# Patient Record
Sex: FEMALE | Race: WHITE | Employment: FULL TIME | URBAN - METROPOLITAN AREA
[De-identification: names, ages, dates, MRNs, and addresses within clinical notes are randomized per-mention and may not be internally consistent; named-entity substitution may affect disease eponyms.]

---

## 2017-01-21 ENCOUNTER — HOSPITAL ENCOUNTER (OUTPATIENT)
Dept: CT IMAGING | Facility: HOSPITAL | Age: 62
Discharge: HOME | End: 2017-01-21
Attending: UROLOGY
Payer: MEDICAID

## 2017-01-21 DIAGNOSIS — L91.8 FIBROEPITHELIAL POLYP: ICD-10-CM

## 2017-01-21 DIAGNOSIS — N13.30 HYDRONEPHROSIS, LEFT: ICD-10-CM

## 2017-01-21 LAB — CREAT BLD-MCNC: 0.8 MG/DL (ref 0.5–1.5)

## 2017-01-21 PROCEDURE — 82565 ASSAY OF CREATININE: CPT

## 2017-01-21 PROCEDURE — 74178 CT ABD&PLV WO CNTR FLWD CNTR: CPT

## 2017-02-08 ENCOUNTER — HOSPITAL (OUTPATIENT)
Dept: OTHER | Age: 62
End: 2017-02-08

## 2017-02-08 LAB
GLUCOSE BLDC GLUCOMTR-MCNC: 176 MG/DL (ref 65–99)
GLUCOSE BLDC GLUCOMTR-MCNC: 176 MG/DL (ref 65–99)

## 2017-03-14 PROBLEM — L91.8 FIBROEPITHELIAL POLYP: Status: ACTIVE | Noted: 2017-03-14

## 2017-03-21 ENCOUNTER — HOSPITAL (OUTPATIENT)
Dept: OTHER | Age: 62
End: 2017-03-21
Attending: UROLOGY

## 2017-03-21 LAB
ANION GAP SERPL CALC-SCNC: 15 MMOL/L (ref 10–20)
BUN SERPL-MCNC: 24 MG/DL (ref 6–20)
BUN/CREAT SERPL: 24 (ref 7–25)
CALCIUM SERPL-MCNC: 9 MG/DL (ref 8.4–10.2)
CHLORIDE: 107 MMOL/L (ref 98–107)
CO2 SERPL-SCNC: 23 MMOL/L (ref 21–32)
CREAT SERPL-MCNC: 0.99 MG/DL (ref 0.51–0.95)
GLUCOSE SERPL-MCNC: 181 MG/DL (ref 65–99)
LENGTH OF FAST TIME PATIENT: ABNORMAL HR
POTASSIUM SERPL-SCNC: 3.9 MMOL/L (ref 3.4–5.1)
SODIUM SERPL-SCNC: 141 MMOL/L (ref 135–145)

## 2017-04-01 ENCOUNTER — HOSPITAL (OUTPATIENT)
Dept: OTHER | Age: 62
End: 2017-04-01
Attending: UROLOGY

## 2017-06-15 ENCOUNTER — ANESTHESIA EVENT (OUTPATIENT)
Dept: SURGERY | Facility: HOSPITAL | Age: 62
DRG: 655 | End: 2017-06-15
Payer: MEDICAID

## 2017-06-20 ENCOUNTER — HOSPITAL ENCOUNTER (INPATIENT)
Facility: HOSPITAL | Age: 62
LOS: 1 days | Discharge: HOME | DRG: 655 | End: 2017-06-21
Attending: UROLOGY | Admitting: UROLOGY
Payer: MEDICAID

## 2017-06-20 ENCOUNTER — SURGERY (OUTPATIENT)
Age: 62
End: 2017-06-20

## 2017-06-20 ENCOUNTER — ANESTHESIA (OUTPATIENT)
Dept: SURGERY | Facility: HOSPITAL | Age: 62
DRG: 655 | End: 2017-06-20
Payer: MEDICAID

## 2017-06-20 PROBLEM — N13.5 URETERAL OBSTRUCTION, RIGHT: Status: ACTIVE | Noted: 2017-06-20

## 2017-06-20 PROCEDURE — 0T774DZ DILATION OF LEFT URETER WITH INTRALUMINAL DEVICE, PERCUTANEOUS ENDOSCOPIC APPROACH: ICD-10-PCS | Performed by: UROLOGY

## 2017-06-20 PROCEDURE — 88305 TISSUE EXAM BY PATHOLOGIST: CPT | Performed by: UROLOGY

## 2017-06-20 PROCEDURE — 94660 CPAP INITIATION&MGMT: CPT

## 2017-06-20 PROCEDURE — 82962 GLUCOSE BLOOD TEST: CPT

## 2017-06-20 PROCEDURE — 0TN73ZZ RELEASE LEFT URETER, PERCUTANEOUS APPROACH: ICD-10-PCS | Performed by: UROLOGY

## 2017-06-20 PROCEDURE — 0T174ZB BYPASS LEFT URETER TO BLADDER, PERCUTANEOUS ENDOSCOPIC APPROACH: ICD-10-PCS | Performed by: UROLOGY

## 2017-06-20 PROCEDURE — 0TQB4ZZ REPAIR BLADDER, PERCUTANEOUS ENDOSCOPIC APPROACH: ICD-10-PCS | Performed by: UROLOGY

## 2017-06-20 PROCEDURE — 8E0W4CZ ROBOTIC ASSISTED PROCEDURE OF TRUNK REGION, PERCUTANEOUS ENDOSCOPIC APPROACH: ICD-10-PCS | Performed by: UROLOGY

## 2017-06-20 PROCEDURE — 88342 IMHCHEM/IMCYTCHM 1ST ANTB: CPT | Performed by: UROLOGY

## 2017-06-20 DEVICE — URETERAL STENT
Type: IMPLANTABLE DEVICE | Site: URETER | Status: FUNCTIONAL
Brand: PERCUFLEX™

## 2017-06-20 RX ORDER — IBUPROFEN 400 MG/1
400 TABLET ORAL EVERY 6 HOURS PRN
Status: DISCONTINUED | OUTPATIENT
Start: 2017-06-20 | End: 2017-06-21

## 2017-06-20 RX ORDER — POLYETHYLENE GLYCOL 3350 17 G/17G
17 POWDER, FOR SOLUTION ORAL DAILY
Status: DISCONTINUED | OUTPATIENT
Start: 2017-06-21 | End: 2017-06-21

## 2017-06-20 RX ORDER — SODIUM CHLORIDE, SODIUM LACTATE, POTASSIUM CHLORIDE, CALCIUM CHLORIDE 600; 310; 30; 20 MG/100ML; MG/100ML; MG/100ML; MG/100ML
INJECTION, SOLUTION INTRAVENOUS CONTINUOUS
Status: DISCONTINUED | OUTPATIENT
Start: 2017-06-20 | End: 2017-06-21

## 2017-06-20 RX ORDER — TRAMADOL HYDROCHLORIDE 50 MG/1
100 TABLET ORAL EVERY 4 HOURS PRN
Status: DISCONTINUED | OUTPATIENT
Start: 2017-06-20 | End: 2017-06-21

## 2017-06-20 RX ORDER — SODIUM CHLORIDE AND POTASSIUM CHLORIDE .9; .15 G/100ML; G/100ML
SOLUTION INTRAVENOUS CONTINUOUS
Status: DISCONTINUED | OUTPATIENT
Start: 2017-06-20 | End: 2017-06-21

## 2017-06-20 RX ORDER — TRAMADOL HYDROCHLORIDE 50 MG/1
50 TABLET ORAL EVERY 4 HOURS PRN
Status: DISCONTINUED | OUTPATIENT
Start: 2017-06-20 | End: 2017-06-20

## 2017-06-20 RX ORDER — DIPHENHYDRAMINE HYDROCHLORIDE 50 MG/ML
12.5 INJECTION INTRAMUSCULAR; INTRAVENOUS AS NEEDED
Status: DISCONTINUED | OUTPATIENT
Start: 2017-06-20 | End: 2017-06-20 | Stop reason: HOSPADM

## 2017-06-20 RX ORDER — ENOXAPARIN SODIUM 100 MG/ML
0.5 INJECTION SUBCUTANEOUS EVERY EVENING
Status: DISCONTINUED | OUTPATIENT
Start: 2017-06-20 | End: 2017-06-21

## 2017-06-20 RX ORDER — ROPIVACAINE HYDROCHLORIDE 5 MG/ML
INJECTION, SOLUTION EPIDURAL; INFILTRATION; PERINEURAL AS NEEDED
Status: DISCONTINUED | OUTPATIENT
Start: 2017-06-20 | End: 2017-06-20 | Stop reason: HOSPADM

## 2017-06-20 RX ORDER — ACETAMINOPHEN 10 MG/ML
INJECTION, SOLUTION INTRAVENOUS
Status: COMPLETED
Start: 2017-06-20 | End: 2017-06-20

## 2017-06-20 RX ORDER — SODIUM CHLORIDE, SODIUM LACTATE, POTASSIUM CHLORIDE, CALCIUM CHLORIDE 600; 310; 30; 20 MG/100ML; MG/100ML; MG/100ML; MG/100ML
INJECTION, SOLUTION INTRAVENOUS CONTINUOUS
Status: DISCONTINUED | OUTPATIENT
Start: 2017-06-20 | End: 2017-06-20 | Stop reason: HOSPADM

## 2017-06-20 RX ORDER — HYDROMORPHONE HYDROCHLORIDE 1 MG/ML
0.2 INJECTION, SOLUTION INTRAMUSCULAR; INTRAVENOUS; SUBCUTANEOUS EVERY 2 HOUR PRN
Status: DISCONTINUED | OUTPATIENT
Start: 2017-06-20 | End: 2017-06-21

## 2017-06-20 RX ORDER — TRAMADOL HYDROCHLORIDE 50 MG/1
100 TABLET ORAL EVERY 6 HOURS PRN
Status: DISCONTINUED | OUTPATIENT
Start: 2017-06-20 | End: 2017-06-20

## 2017-06-20 RX ORDER — TRAMADOL HYDROCHLORIDE 50 MG/1
50 TABLET ORAL EVERY 4 HOURS PRN
Status: DISCONTINUED | OUTPATIENT
Start: 2017-06-20 | End: 2017-06-21

## 2017-06-20 RX ORDER — METOCLOPRAMIDE HYDROCHLORIDE 5 MG/ML
10 INJECTION INTRAMUSCULAR; INTRAVENOUS AS NEEDED
Status: DISCONTINUED | OUTPATIENT
Start: 2017-06-20 | End: 2017-06-20 | Stop reason: HOSPADM

## 2017-06-20 RX ORDER — MEPERIDINE HYDROCHLORIDE 25 MG/ML
12.5 INJECTION INTRAMUSCULAR; INTRAVENOUS; SUBCUTANEOUS AS NEEDED
Status: DISCONTINUED | OUTPATIENT
Start: 2017-06-20 | End: 2017-06-20 | Stop reason: HOSPADM

## 2017-06-20 RX ORDER — DEXTROSE MONOHYDRATE 25 G/50ML
50 INJECTION, SOLUTION INTRAVENOUS
Status: DISCONTINUED | OUTPATIENT
Start: 2017-06-20 | End: 2017-06-20 | Stop reason: HOSPADM

## 2017-06-20 RX ORDER — DOCUSATE SODIUM 100 MG/1
100 CAPSULE, LIQUID FILLED ORAL 2 TIMES DAILY
Status: DISCONTINUED | OUTPATIENT
Start: 2017-06-20 | End: 2017-06-21

## 2017-06-20 RX ORDER — HYDROCODONE BITARTRATE AND ACETAMINOPHEN 5; 325 MG/1; MG/1
2 TABLET ORAL EVERY 4 HOURS PRN
Status: DISCONTINUED | OUTPATIENT
Start: 2017-06-20 | End: 2017-06-20

## 2017-06-20 RX ORDER — ONDANSETRON 2 MG/ML
4 INJECTION INTRAMUSCULAR; INTRAVENOUS EVERY 6 HOURS PRN
Status: DISCONTINUED | OUTPATIENT
Start: 2017-06-20 | End: 2017-06-21

## 2017-06-20 RX ORDER — INSULIN ASPART 100 [IU]/ML
INJECTION, SOLUTION INTRAVENOUS; SUBCUTANEOUS
Status: DISPENSED
Start: 2017-06-20 | End: 2017-06-20

## 2017-06-20 RX ORDER — INSULIN ASPART 100 [IU]/ML
INJECTION, SOLUTION INTRAVENOUS; SUBCUTANEOUS
Status: COMPLETED
Start: 2017-06-20 | End: 2017-06-20

## 2017-06-20 RX ORDER — INSULIN ASPART 100 [IU]/ML
INJECTION, SOLUTION INTRAVENOUS; SUBCUTANEOUS ONCE
Status: COMPLETED | OUTPATIENT
Start: 2017-06-20 | End: 2017-06-20

## 2017-06-20 RX ORDER — ASPIRIN 81 MG/1
81 TABLET, CHEWABLE ORAL DAILY
Status: DISCONTINUED | OUTPATIENT
Start: 2017-06-21 | End: 2017-06-21

## 2017-06-20 RX ORDER — KETOROLAC TROMETHAMINE 30 MG/ML
INJECTION, SOLUTION INTRAMUSCULAR; INTRAVENOUS
Status: COMPLETED
Start: 2017-06-20 | End: 2017-06-20

## 2017-06-20 RX ORDER — ONDANSETRON 2 MG/ML
4 INJECTION INTRAMUSCULAR; INTRAVENOUS AS NEEDED
Status: DISCONTINUED | OUTPATIENT
Start: 2017-06-20 | End: 2017-06-20 | Stop reason: HOSPADM

## 2017-06-20 RX ORDER — ATORVASTATIN CALCIUM 40 MG/1
40 TABLET, FILM COATED ORAL NIGHTLY
COMMUNITY
End: 2018-07-14

## 2017-06-20 RX ORDER — HYDROMORPHONE HYDROCHLORIDE 1 MG/ML
0.4 INJECTION, SOLUTION INTRAMUSCULAR; INTRAVENOUS; SUBCUTANEOUS EVERY 5 MIN PRN
Status: DISCONTINUED | OUTPATIENT
Start: 2017-06-20 | End: 2017-06-20 | Stop reason: HOSPADM

## 2017-06-20 RX ORDER — NALOXONE HYDROCHLORIDE 0.4 MG/ML
80 INJECTION, SOLUTION INTRAMUSCULAR; INTRAVENOUS; SUBCUTANEOUS AS NEEDED
Status: DISCONTINUED | OUTPATIENT
Start: 2017-06-20 | End: 2017-06-20 | Stop reason: HOSPADM

## 2017-06-20 RX ORDER — INSULIN ASPART 100 [IU]/ML
8 INJECTION, SOLUTION INTRAVENOUS; SUBCUTANEOUS ONCE
Status: COMPLETED | OUTPATIENT
Start: 2017-06-20 | End: 2017-06-20

## 2017-06-20 RX ORDER — HYDROCODONE BITARTRATE AND ACETAMINOPHEN 5; 325 MG/1; MG/1
1 TABLET ORAL EVERY 4 HOURS PRN
Qty: 30 TABLET | Refills: 0 | Status: SHIPPED | OUTPATIENT
Start: 2017-06-20 | End: 2017-06-30

## 2017-06-20 RX ORDER — HYDROCODONE BITARTRATE AND ACETAMINOPHEN 5; 325 MG/1; MG/1
1 TABLET ORAL EVERY 4 HOURS PRN
Status: DISCONTINUED | OUTPATIENT
Start: 2017-06-20 | End: 2017-06-20

## 2017-06-20 RX ORDER — LEVOTHYROXINE SODIUM 0.15 MG/1
150 TABLET ORAL
Status: DISCONTINUED | OUTPATIENT
Start: 2017-06-21 | End: 2017-06-21

## 2017-06-20 RX ORDER — ACETAMINOPHEN 10 MG/ML
1000 INJECTION, SOLUTION INTRAVENOUS ONCE
Status: COMPLETED | OUTPATIENT
Start: 2017-06-20 | End: 2017-06-20

## 2017-06-20 RX ORDER — MULTIPLE VITAMINS W/ MINERALS TAB 9MG-400MCG
1 TAB ORAL DAILY
Status: DISCONTINUED | OUTPATIENT
Start: 2017-06-20 | End: 2017-06-21

## 2017-06-20 RX ORDER — KETOROLAC TROMETHAMINE 15 MG/ML
15 INJECTION, SOLUTION INTRAMUSCULAR; INTRAVENOUS EVERY 6 HOURS
Status: DISCONTINUED | OUTPATIENT
Start: 2017-06-20 | End: 2017-06-21

## 2017-06-20 RX ORDER — HYDROMORPHONE HYDROCHLORIDE 1 MG/ML
INJECTION, SOLUTION INTRAMUSCULAR; INTRAVENOUS; SUBCUTANEOUS
Status: COMPLETED
Start: 2017-06-20 | End: 2017-06-20

## 2017-06-20 RX ORDER — DOCUSATE SODIUM 100 MG/1
100 CAPSULE, LIQUID FILLED ORAL 2 TIMES DAILY
Qty: 60 CAPSULE | Refills: 11 | Status: SHIPPED | OUTPATIENT
Start: 2017-06-20 | End: 2017-07-05 | Stop reason: ALTCHOICE

## 2017-06-20 RX ORDER — DEXTROSE MONOHYDRATE 25 G/50ML
50 INJECTION, SOLUTION INTRAVENOUS
Status: DISCONTINUED | OUTPATIENT
Start: 2017-06-20 | End: 2017-06-21

## 2017-06-20 RX ORDER — ATORVASTATIN CALCIUM 40 MG/1
40 TABLET, FILM COATED ORAL NIGHTLY
Status: DISCONTINUED | OUTPATIENT
Start: 2017-06-20 | End: 2017-06-21

## 2017-06-20 RX ORDER — MIDAZOLAM HYDROCHLORIDE 1 MG/ML
1 INJECTION INTRAMUSCULAR; INTRAVENOUS EVERY 5 MIN PRN
Status: DISCONTINUED | OUTPATIENT
Start: 2017-06-20 | End: 2017-06-20 | Stop reason: HOSPADM

## 2017-06-20 NOTE — PROGRESS NOTES
06/20/17 7461   Clinical Encounter Type   Visited With Patient and family together   Routine Visit Introduction   Continue Visiting No   Surgical Visit Post-op   Patient's Supportive Strategies/Resources Pt has very supportive twin sister, Kurt Espinal.    Reli

## 2017-06-20 NOTE — H&P (VIEW-ONLY)
William Menendez is a 58year old female.    Patient presents with:  Pre-Op Exam: 6/20/17 cystoscopy dr Pascual Gee fax [phone]    _______________________________________________________________________  HPI:  preop to have another sg for hydronephros shoulder rotator cuff sg    OTHER SURGICAL HISTORY      Comment rt patella frx fragment removed arthroscopic    OTHER SURGICAL HISTORY      Comment left meniscus/cartilate knee sg arthroscopic    OTHER SURGICAL HISTORY  5/16    Comment left ureter stent fo Pulse: 85   Temp: 97.8 °F (36.6 °C)   TempSrc: Oral   Height: 62\"   Weight: 270 lb (122.471 kg)   SpO2: 98%       GENERAL: well developed, well nourished, in no apparent distress  SKIN: no rashes, no suspicious lesions  EYES: PERRLA, EOMI, sclera anicte

## 2017-06-20 NOTE — ANESTHESIA PREPROCEDURE EVALUATION
PRE-OP EVALUATION    Patient Name: Karthik Medellin    Pre-op Diagnosis: LEFT URETERAL POLYP, LEFT HYDRONEPHROSIS    Procedure(s):  CYSTOSCOPY, LEFT RETROGRADE PYELOGRAM, LEFT URETEROCOPY, LEFT STENT PLACEMENT, ROBOTIC LEFT URETER EXPLORATION, POSSIBLE patella frx fragment removed arthroscopic    OTHER SURGICAL HISTORY      Comment left meniscus/cartilate knee sg arthroscopic    OTHER SURGICAL HISTORY  5/16    Comment left ureter stent for large stone 2 cm    OTHER SURGICAL HISTORY  6/14/16    Comment Cy

## 2017-06-20 NOTE — PROGRESS NOTES
06/20/17 2703   Clinical Encounter Type   Visited With Patient and family together   Routine Visit Introduction   Continue Visiting No   Surgical Visit Post-op   Patient's Supportive Strategies/Resources Pt has very supportive twin sister, Bucky Marshall.    Reli

## 2017-06-20 NOTE — BRIEF OP NOTE
Pre-Operative Diagnosis: LEFT URETERAL POLYP, LEFT HYDRONEPHROSIS     Post-Operative Diagnosis: LEFT URETERAL POLYP, LEFT HYDRONEPHROSIS     Procedure Performed:   Procedure(s):, LEFT STENT PLACEMENT, ROBOTIC LEFT URETEROLYSIS, LEFT URETERONEOCYSTOTOMY,

## 2017-06-20 NOTE — PROGRESS NOTES
[facility] Progress Note:  Anticoagulation Weight Dose Adjustment for enoxaparin (Bernerd Level)    Haresh Sanchez is a 58year old female who has been prescribed enoxaparin (LOVENOX) for VTE prophylaxis.       Estimated Creatinine Clearance: 59.9 mL/min (

## 2017-06-20 NOTE — INTERVAL H&P NOTE
Pre-op Diagnosis: LEFT URETERAL POLYP, LEFT HYDRONEPHROSIS    The above referenced H&P was reviewed by Sandra Kaplan MD on 6/20/2017, the patient was examined and no significant changes have occurred in the patient's condition since the H&P was performed

## 2017-06-20 NOTE — ANESTHESIA POSTPROCEDURE EVALUATION
[address] Patient Status:  Surgery Admit   Age/Gender 58year old female MRN [id number]   [facility] SURGERY Attending Keshia Arias MD   Hosp Day # 0 PCP Zofia Escudero MD       Anesthesia Post-op Note    P

## 2017-06-21 VITALS
WEIGHT: 269.81 LBS | OXYGEN SATURATION: 96 % | HEIGHT: 62 IN | TEMPERATURE: 98 F | SYSTOLIC BLOOD PRESSURE: 134 MMHG | DIASTOLIC BLOOD PRESSURE: 78 MMHG | BODY MASS INDEX: 49.65 KG/M2 | RESPIRATION RATE: 16 BRPM | HEART RATE: 79 BPM

## 2017-06-21 PROBLEM — Z98.890 S/P URETERAL REIMPLANTATION: Status: ACTIVE | Noted: 2017-06-21

## 2017-06-21 PROCEDURE — 83036 HEMOGLOBIN GLYCOSYLATED A1C: CPT | Performed by: UROLOGY

## 2017-06-21 PROCEDURE — 85049 AUTOMATED PLATELET COUNT: CPT | Performed by: UROLOGY

## 2017-06-21 PROCEDURE — 82962 GLUCOSE BLOOD TEST: CPT

## 2017-06-21 PROCEDURE — 80048 BASIC METABOLIC PNL TOTAL CA: CPT | Performed by: UROLOGY

## 2017-06-21 PROCEDURE — 82570 ASSAY OF URINE CREATININE: CPT | Performed by: UROLOGY

## 2017-06-21 NOTE — OPERATIVE REPORT
Operative Report      Procedure Date: 06/20/2017     Patient Name: Maurice Mercedes  [id number]  4/19/1955       Preoperative Diagnosis:   1. Left hydronephrosis from distal ureteral obstruction  2.  Left fibroepithelial polyp       Postoperative Diagnosi injury to the bowel or other intrabdominal contents. Under vision, three 8 mm robotic arm ports were placed in the expected configuration.   A 12 mm assistant port was placed on the lateral abdominal wall.        A few adhesions were taken down using cold a underlying mucosa. The mucosa was opened using cold scissors. Using interrupted 4-0 vicryl sutures starting at the crotch of the ureter, the ureteroneocystotomy anastomosis was completed.  Prior to complete closure of the anastomosis, a 6 x 22 cm doub

## 2017-06-21 NOTE — PROGRESS NOTES
[facility]  Urology Progress Note    Troy Shelby Patient Status:  Inpatient    1955 MRN [id number]   [facility] 3NW-A Attending Judy Escalona MD   [address] Day # 1 PCP Zofia Colmenares MD     Subjective:  Lauren Medellin catheter/ureteral stent    Post-op instructions, restrictions reviewed with patient  Discharge home later today with bullock catheter pending no change in patient's clinical course. Cystogram next Tuesday or Wed.   Bullock tentatively out next Friday pending r

## 2017-06-21 NOTE — PAYOR COMM NOTE
--------------  ADMISSION REVIEW     Payor: Aidan Christie #:  [id number]  Authorization Number: [id number]/A    Admit date: 6/20/2017  8:15 AM       Admitting Physician: Mariel Bowen MD  Attending Physician:  Sandeep Larose mg Oral Marina Peterson RN    6/20/2017 2133 Given 100 mg Oral Mateo Allison RN      Enoxaparin Sodium (LOVENOX) 60 MG/0.6ML injection 60 mg     Date Action Dose Route User    6/20/2017 2143 Given 60 mg Subcutaneous (Left Lower Abdomen) Vicente Romero SAMMY Azevedo      ondansetron HCl [facility]) injection 4 mg     Date Action Dose Route User    6/20/2017 1936 Given 4 mg Intravenous Sandra Torres RN      PEG [address] [facility]) powder packet 17 g     Date Action Dose Route User    6/21/2017 0831 Given 17 g

## 2017-06-21 NOTE — PROGRESS NOTES
Discharge instructions reviewed with the patient. Pt's iv site removed, tip of iv catheter intact, band aid placed over site. Pt going home with (2) rx's for Norco and Colace. Pt instructed on usage/indications of medications.  Pt instructed no driving, no

## 2017-06-21 NOTE — RESPIRATORY THERAPY NOTE
JERRY - Equipment Use Daily Summary:                  . Set Mode:                . Usage in hours:                . 90% Pressure (EPAP) level:                . 90% Insp. Pressure (IPAP): Slater August AHI:                .  Supplemental Oxygen:

## 2017-06-21 NOTE — PLAN OF CARE
Minimal or absence of nausea and vomiting Progressing      Verbalizes/displays adequate comfort level or patient's stated pain goal Progressing      Incision(s), wounds(s) or drain site(s) healing without S/S of infection Progressing      Pt resting in bed

## 2017-06-23 NOTE — PAYOR COMM NOTE
--------------  DISCHARGE REVIEW    Payor: Aidan Shahnaz #:  [id number]  Authorization Number: [id number]    Admit date: 6/20/2017  8:15 AM  Discharge Date: 6/21/2017  3:14 PM     Admitting Physician: Judy Escalona Urology Author Type: Physician     Filed: 6/21/2017 12:36 PM Note Time: 6/21/2017  7:36 AM Status: Signed     : Belle De Santiago MD (Physician)         Operative Report      Procedure Date: 06/21/2017     Patient Name: Jennifer Tan  [id number] placed in the midline approximately 3 fingerbreaths above the umbilicus.  Once inside the abdomen, the laparoscope was used to ensure there were no inadvertent injury to the bowel or other intrabdominal contents.  Under vision, three 8 mm robotic arm ports tension-free anastomosis.  The site of the cystotomy for the anastomosis was found.  The seromuscular layer was entered using cautery and cleared off the underlying mucosa.  The mucosa was opened using cold scissors.      Using interrupted 4-0 vicryl suture

## 2017-07-05 PROCEDURE — 87070 CULTURE OTHR SPECIMN AEROBIC: CPT | Performed by: FAMILY MEDICINE

## 2017-07-05 PROCEDURE — 87186 SC STD MICRODIL/AGAR DIL: CPT | Performed by: FAMILY MEDICINE

## 2017-07-05 PROCEDURE — 87205 SMEAR GRAM STAIN: CPT | Performed by: FAMILY MEDICINE

## 2017-07-05 PROCEDURE — 87077 CULTURE AEROBIC IDENTIFY: CPT | Performed by: PHYSICIAN ASSISTANT

## 2017-07-05 PROCEDURE — 87186 SC STD MICRODIL/AGAR DIL: CPT | Performed by: PHYSICIAN ASSISTANT

## 2017-07-05 PROCEDURE — 87086 URINE CULTURE/COLONY COUNT: CPT | Performed by: PHYSICIAN ASSISTANT

## 2017-07-05 PROCEDURE — 87077 CULTURE AEROBIC IDENTIFY: CPT | Performed by: FAMILY MEDICINE

## 2017-07-18 PROCEDURE — 81001 URINALYSIS AUTO W/SCOPE: CPT | Performed by: FAMILY MEDICINE

## 2017-07-18 PROCEDURE — 87086 URINE CULTURE/COLONY COUNT: CPT | Performed by: FAMILY MEDICINE

## 2017-09-22 PROCEDURE — 87186 SC STD MICRODIL/AGAR DIL: CPT | Performed by: EMERGENCY MEDICINE

## 2017-09-22 PROCEDURE — 87086 URINE CULTURE/COLONY COUNT: CPT | Performed by: EMERGENCY MEDICINE

## 2017-10-21 PROCEDURE — 80053 COMPREHEN METABOLIC PANEL: CPT | Performed by: FAMILY MEDICINE

## 2017-10-21 PROCEDURE — 83970 ASSAY OF PARATHORMONE: CPT | Performed by: UROLOGY

## 2017-10-21 PROCEDURE — 83036 HEMOGLOBIN GLYCOSYLATED A1C: CPT | Performed by: FAMILY MEDICINE

## 2017-10-21 PROCEDURE — 80061 LIPID PANEL: CPT | Performed by: FAMILY MEDICINE

## 2017-10-21 PROCEDURE — 36415 COLL VENOUS BLD VENIPUNCTURE: CPT | Performed by: UROLOGY

## 2017-10-21 PROCEDURE — 84443 ASSAY THYROID STIM HORMONE: CPT | Performed by: FAMILY MEDICINE

## 2017-10-21 PROCEDURE — 83735 ASSAY OF MAGNESIUM: CPT | Performed by: FAMILY MEDICINE

## 2017-10-21 PROCEDURE — 84100 ASSAY OF PHOSPHORUS: CPT | Performed by: FAMILY MEDICINE

## 2017-10-23 PROCEDURE — 82436 ASSAY OF URINE CHLORIDE: CPT | Performed by: UROLOGY

## 2017-10-23 PROCEDURE — 82507 ASSAY OF CITRATE: CPT | Performed by: UROLOGY

## 2017-10-23 PROCEDURE — 84392 ASSAY OF URINE SULFATE: CPT | Performed by: UROLOGY

## 2017-10-23 PROCEDURE — 82340 ASSAY OF CALCIUM IN URINE: CPT | Performed by: UROLOGY

## 2017-10-23 PROCEDURE — 83945 ASSAY OF OXALATE: CPT | Performed by: UROLOGY

## 2017-11-08 RX ORDER — CHOLECALCIFEROL (VITAMIN D3) 50 MCG
CAPSULE ORAL
COMMUNITY

## 2017-11-11 ENCOUNTER — HOSPITAL (OUTPATIENT)
Dept: OTHER | Age: 62
End: 2017-11-11
Attending: FAMILY MEDICINE

## 2017-11-22 ENCOUNTER — SURGERY (OUTPATIENT)
Age: 62
End: 2017-11-22

## 2017-11-22 ENCOUNTER — HOSPITAL ENCOUNTER (OUTPATIENT)
Facility: HOSPITAL | Age: 62
Setting detail: HOSPITAL OUTPATIENT SURGERY
Discharge: HOME | End: 2017-11-22
Attending: INTERNAL MEDICINE | Admitting: INTERNAL MEDICINE
Payer: MEDICAID

## 2017-11-22 VITALS
SYSTOLIC BLOOD PRESSURE: 119 MMHG | TEMPERATURE: 98 F | HEART RATE: 85 BPM | WEIGHT: 260 LBS | RESPIRATION RATE: 20 BRPM | HEIGHT: 64.5 IN | DIASTOLIC BLOOD PRESSURE: 73 MMHG | OXYGEN SATURATION: 97 % | BODY MASS INDEX: 43.85 KG/M2

## 2017-11-22 DIAGNOSIS — Z12.11 SCREENING FOR MALIGNANT NEOPLASM OF COLON: ICD-10-CM

## 2017-11-22 PROCEDURE — 0DBL8ZX EXCISION OF TRANSVERSE COLON, VIA NATURAL OR ARTIFICIAL OPENING ENDOSCOPIC, DIAGNOSTIC: ICD-10-PCS | Performed by: INTERNAL MEDICINE

## 2017-11-22 PROCEDURE — 82962 GLUCOSE BLOOD TEST: CPT

## 2017-11-22 PROCEDURE — 88305 TISSUE EXAM BY PATHOLOGIST: CPT | Performed by: INTERNAL MEDICINE

## 2017-11-22 RX ORDER — SODIUM CHLORIDE, SODIUM LACTATE, POTASSIUM CHLORIDE, CALCIUM CHLORIDE 600; 310; 30; 20 MG/100ML; MG/100ML; MG/100ML; MG/100ML
INJECTION, SOLUTION INTRAVENOUS CONTINUOUS
Status: DISCONTINUED | OUTPATIENT
Start: 2017-11-22 | End: 2017-11-22

## 2017-11-22 RX ORDER — MIDAZOLAM HYDROCHLORIDE 1 MG/ML
INJECTION INTRAMUSCULAR; INTRAVENOUS
Status: DISCONTINUED | OUTPATIENT
Start: 2017-11-22 | End: 2017-11-22

## 2017-11-22 NOTE — H&P
[facility] Endoscopy Health History   [facility] Department of  Gastroenterology  Update Health History :       Lior Cuff  female   Tesfaye Merlos MD     [id number]  4/19/1955 Primary Care Physician  Zofia Louis MD Alcohol use: No                 ALLERGIES:     Codeine                     Comment:CAN NOT BREATHE  Mayonaise               Nausea and vomiting    Current MEDS:    No current facility-administered medications on file prior to encounter.    Current Outpati tenderness   RECTAL: Exam not done.    MUSCULOSKELETAL: back is not tender,FROM of the back   EXTREMITIES: no cyanosis, clubbing or edema   NEURO: Oriented times three, cranial nerves are intact, motor and sensory are grossly intact    Plan:   colonoscopy

## 2017-11-22 NOTE — BRIEF OP NOTE
Pre-Operative Diagnosis: Screening for malignant neoplasm of colon [Z12.11]     Post-Operative Diagnosis: colon polyp, hemorrhoids, diverticulosis     Procedure Performed:   Procedure(s):  colonoscopy w bx    Surgeon(s) and Role:     * Bree Akhtar

## 2017-11-27 NOTE — PROGRESS NOTES
11/27/2017  Ana M More [id number]    Dear Osacr Jacome,       Here are the  biopsy/pathology findings from your recent Colonoscopy :    an adenomatous polyp(s), which is a benign potentially pre-cancerous growt

## 2017-12-23 PROCEDURE — 83970 ASSAY OF PARATHORMONE: CPT | Performed by: UROLOGY

## 2018-04-23 PROCEDURE — 82043 UR ALBUMIN QUANTITATIVE: CPT | Performed by: FAMILY MEDICINE

## 2018-04-23 PROCEDURE — 82570 ASSAY OF URINE CREATININE: CPT | Performed by: FAMILY MEDICINE

## 2018-07-14 PROBLEM — E11.42 DIABETIC PERIPHERAL NEUROPATHY (HCC): Status: ACTIVE | Noted: 2018-07-14

## 2018-07-14 PROCEDURE — 82746 ASSAY OF FOLIC ACID SERUM: CPT | Performed by: FAMILY MEDICINE

## 2018-07-14 PROCEDURE — 36415 COLL VENOUS BLD VENIPUNCTURE: CPT | Performed by: FAMILY MEDICINE

## 2018-07-14 PROCEDURE — 82607 VITAMIN B-12: CPT | Performed by: FAMILY MEDICINE

## 2018-08-14 PROBLEM — M19.011 ARTHRITIS OF RIGHT ACROMIOCLAVICULAR JOINT: Status: ACTIVE | Noted: 2018-08-14

## 2018-11-09 PROCEDURE — 87186 SC STD MICRODIL/AGAR DIL: CPT | Performed by: PHYSICIAN ASSISTANT

## 2018-11-09 PROCEDURE — 87086 URINE CULTURE/COLONY COUNT: CPT | Performed by: PHYSICIAN ASSISTANT

## 2018-11-09 PROCEDURE — 87077 CULTURE AEROBIC IDENTIFY: CPT | Performed by: PHYSICIAN ASSISTANT

## 2019-07-11 ENCOUNTER — HOSPITAL (OUTPATIENT)
Dept: OTHER | Age: 64
End: 2019-07-11
Attending: FAMILY MEDICINE

## 2019-10-21 PROBLEM — Z80.41 FAMILY HISTORY OF OVARIAN CANCER: Status: ACTIVE | Noted: 2019-10-21

## 2020-11-22 PROBLEM — Z98.890 S/P URETERAL REIMPLANTATION: Status: RESOLVED | Noted: 2017-06-21 | Resolved: 2020-11-22

## 2020-11-22 PROBLEM — L91.8 FIBROEPITHELIAL POLYP: Status: RESOLVED | Noted: 2017-03-14 | Resolved: 2020-11-22

## 2020-11-22 PROBLEM — N13.5 URETERAL OBSTRUCTION, RIGHT: Status: RESOLVED | Noted: 2017-06-20 | Resolved: 2020-11-22

## 2021-03-30 ENCOUNTER — LAB ENCOUNTER (OUTPATIENT)
Dept: LAB | Facility: HOSPITAL | Age: 66
End: 2021-03-30
Attending: PODIATRIST
Payer: MEDICARE

## 2021-03-30 DIAGNOSIS — M19.011 ARTHRITIS OF RIGHT ACROMIOCLAVICULAR JOINT: ICD-10-CM

## 2021-04-02 ENCOUNTER — ANESTHESIA (OUTPATIENT)
Dept: SURGERY | Facility: HOSPITAL | Age: 66
End: 2021-04-02
Payer: MEDICARE

## 2021-04-02 ENCOUNTER — HOSPITAL ENCOUNTER (OUTPATIENT)
Facility: HOSPITAL | Age: 66
Setting detail: HOSPITAL OUTPATIENT SURGERY
Discharge: HOME | End: 2021-04-02
Attending: PODIATRIST | Admitting: PODIATRIST
Payer: MEDICARE

## 2021-04-02 ENCOUNTER — ANESTHESIA EVENT (OUTPATIENT)
Dept: SURGERY | Facility: HOSPITAL | Age: 66
End: 2021-04-02
Payer: MEDICARE

## 2021-04-02 VITALS
SYSTOLIC BLOOD PRESSURE: 133 MMHG | RESPIRATION RATE: 18 BRPM | WEIGHT: 292.69 LBS | DIASTOLIC BLOOD PRESSURE: 86 MMHG | HEIGHT: 62 IN | TEMPERATURE: 98 F | HEART RATE: 79 BPM | OXYGEN SATURATION: 98 % | BODY MASS INDEX: 53.86 KG/M2

## 2021-04-02 DIAGNOSIS — M19.011 ARTHRITIS OF RIGHT ACROMIOCLAVICULAR JOINT: Primary | ICD-10-CM

## 2021-04-02 DIAGNOSIS — E11.9 CONTROLLED TYPE 2 DIABETES MELLITUS WITHOUT COMPLICATION, WITH LONG-TERM CURRENT USE OF INSULIN (HCC): ICD-10-CM

## 2021-04-02 DIAGNOSIS — R22.41 MASS OF FOOT, RIGHT: ICD-10-CM

## 2021-04-02 DIAGNOSIS — Z79.4 CONTROLLED TYPE 2 DIABETES MELLITUS WITHOUT COMPLICATION, WITH LONG-TERM CURRENT USE OF INSULIN (HCC): ICD-10-CM

## 2021-04-02 PROCEDURE — 88304 TISSUE EXAM BY PATHOLOGIST: CPT | Performed by: PODIATRIST

## 2021-04-02 PROCEDURE — 88341 IMHCHEM/IMCYTCHM EA ADD ANTB: CPT | Performed by: PODIATRIST

## 2021-04-02 PROCEDURE — 82962 GLUCOSE BLOOD TEST: CPT

## 2021-04-02 PROCEDURE — 88342 IMHCHEM/IMCYTCHM 1ST ANTB: CPT | Performed by: PODIATRIST

## 2021-04-02 PROCEDURE — 0JBQ0ZX EXCISION OF RIGHT FOOT SUBCUTANEOUS TISSUE AND FASCIA, OPEN APPROACH, DIAGNOSTIC: ICD-10-PCS | Performed by: PODIATRIST

## 2021-04-02 RX ORDER — ACETAMINOPHEN 500 MG
1000 TABLET ORAL ONCE
Status: DISCONTINUED | OUTPATIENT
Start: 2021-04-02 | End: 2021-04-02

## 2021-04-02 RX ORDER — LIDOCAINE HYDROCHLORIDE AND EPINEPHRINE 10; 10 MG/ML; UG/ML
INJECTION, SOLUTION INFILTRATION; PERINEURAL AS NEEDED
Status: DISCONTINUED | OUTPATIENT
Start: 2021-04-02 | End: 2021-04-02 | Stop reason: HOSPADM

## 2021-04-02 RX ORDER — NALOXONE HYDROCHLORIDE 0.4 MG/ML
80 INJECTION, SOLUTION INTRAMUSCULAR; INTRAVENOUS; SUBCUTANEOUS AS NEEDED
Status: DISCONTINUED | OUTPATIENT
Start: 2021-04-02 | End: 2021-04-02

## 2021-04-02 RX ORDER — SODIUM CHLORIDE, SODIUM LACTATE, POTASSIUM CHLORIDE, CALCIUM CHLORIDE 600; 310; 30; 20 MG/100ML; MG/100ML; MG/100ML; MG/100ML
INJECTION, SOLUTION INTRAVENOUS CONTINUOUS
Status: DISCONTINUED | OUTPATIENT
Start: 2021-04-02 | End: 2021-04-02

## 2021-04-02 RX ORDER — IBUPROFEN 600 MG/1
600 TABLET ORAL ONCE AS NEEDED
Status: DISCONTINUED | OUTPATIENT
Start: 2021-04-02 | End: 2021-04-02

## 2021-04-02 RX ORDER — DEXTROSE MONOHYDRATE 25 G/50ML
50 INJECTION, SOLUTION INTRAVENOUS
Status: DISCONTINUED | OUTPATIENT
Start: 2021-04-02 | End: 2021-04-02

## 2021-04-02 RX ORDER — DEXTROSE MONOHYDRATE 25 G/50ML
50 INJECTION, SOLUTION INTRAVENOUS
Status: DISCONTINUED | OUTPATIENT
Start: 2021-04-02 | End: 2021-04-02 | Stop reason: HOSPADM

## 2021-04-02 RX ORDER — MIDAZOLAM HYDROCHLORIDE 1 MG/ML
INJECTION INTRAMUSCULAR; INTRAVENOUS AS NEEDED
Status: DISCONTINUED | OUTPATIENT
Start: 2021-04-02 | End: 2021-04-02 | Stop reason: SURG

## 2021-04-02 RX ORDER — ACETAMINOPHEN 500 MG
1000 TABLET ORAL ONCE AS NEEDED
Status: DISCONTINUED | OUTPATIENT
Start: 2021-04-02 | End: 2021-04-02

## 2021-04-02 RX ADMIN — SODIUM CHLORIDE, SODIUM LACTATE, POTASSIUM CHLORIDE, CALCIUM CHLORIDE: 600; 310; 30; 20 INJECTION, SOLUTION INTRAVENOUS at 13:20:00

## 2021-04-02 RX ADMIN — MIDAZOLAM HYDROCHLORIDE 2 MG: 1 INJECTION INTRAMUSCULAR; INTRAVENOUS at 12:42:00

## 2021-04-02 NOTE — ANESTHESIA POSTPROCEDURE EVALUATION
[address] Patient Status:  Hospital Outpatient Surgery   Age/Gender 72year old female MRN [id number]   [facility] SURGERY Attending Eloise Recinos DPM   Hosp Day # 0 PCP Kelly Montelongo MD       Anesthesia P

## 2021-04-02 NOTE — BRIEF OP NOTE
Pre-Operative Diagnosis: Controlled type 2 diabetes mellitus without complication, with long-term current use of insulin (HCC) [E11.9, Z79.4]  Mass of foot, right [R22.41]     Post-Operative Diagnosis: Controlled type 2 diabetes mellitus without complicati

## 2021-04-02 NOTE — H&P
[facility] Hospitalist H&P       CC: preop eval     PCP: Devin Pelletier MD    History of Present Illness:  Patient is a 72year old female with PMH sig for DMII, HL presenting today to same-day surgery for scheduled excision of R foot mass.      Pt denies any HISTORY  06/20/2017    Spencer lap lt ureterolysis, ureteroneocystomy, psoas hitch, boari flap, lt stent placement- Dr Lazaro Wright   • OTHER SURGICAL HISTORY  07/25/2017    cystoscopy/Stent Removal    • ROBOT-ASSISTED LAPAROSCOPIC PYELOPLASTY Left 6/ standard drinks       Fam Hx  Family History   Problem Relation Age of Onset   • Diabetes Sister    • Cancer Father 54        brain ca   • Diabetes Mother    • Hypertension Mother    • Heart Disorder Brother          from mi at [de-identified]   • Cancer Sister

## 2021-04-02 NOTE — ANESTHESIA PREPROCEDURE EVALUATION
PRE-OP EVALUATION    Patient Name: Alejandro Salinas    Admit Diagnosis: Controlled type 2 diabetes mellitus without complication, with long-term current use of insulin ([facility] [E11.9, Z79.4]  Mass of foot, right [R22.41]    Pre-op Diagnosis: Controlled ty glargine (LANTUS SOLOSTAR) 100 UNIT/ML Subcutaneous Solution Pen-injector, Inject 20 Units into the skin nightly.  Or 15 qhs depending on accuchecks, Disp: 6 pen, Rfl: 3, 4/1/2021 at Unknown time  Insulin Pen Needle (BD PEN NEEDLE YELENA U/F) 32G X 4 MM Does SURGICAL HISTORY      rt shoulder rotator cuff sg   • OTHER SURGICAL HISTORY      rt patella frx fragment removed arthroscopic   • OTHER SURGICAL HISTORY      left meniscus/cartilate knee sg arthroscopic   • OTHER SURGICAL HISTORY  5/16    left ureter sten

## 2021-04-03 NOTE — OPERATIVE REPORT
[facility]    PATIENT'S NAME: Krysta Tsang   ATTENDING PHYSICIAN: Alex Fay D.P.M. OPERATING PHYSICIAN: Alex Fay D.P.M.    PATIENT ACCOUNT#:   [de-identified]    LOCATION:  [address] [place] [address] 10  MEDICAL RECORD #:   [id number] a pickup. There was no stalk. The surrounding tissue appeared intact and unremarkable. The skin was then closed in a simple interrupted fashion with 3-0 Prolene.   Fluoroscan was brought in to make sure that there was no remaining calcification that was

## 2022-04-04 PROBLEM — M19.011 ARTHRITIS OF RIGHT ACROMIOCLAVICULAR JOINT: Status: RESOLVED | Noted: 2018-08-14 | Resolved: 2022-04-04

## 2022-06-28 ENCOUNTER — HOSPITAL ENCOUNTER (OUTPATIENT)
Facility: HOSPITAL | Age: 67
Setting detail: HOSPITAL OUTPATIENT SURGERY
Discharge: HOME | End: 2022-06-28
Attending: INTERNAL MEDICINE | Admitting: INTERNAL MEDICINE
Payer: MEDICARE

## 2022-06-28 ENCOUNTER — ANESTHESIA (OUTPATIENT)
Dept: ENDOSCOPY | Facility: HOSPITAL | Age: 67
End: 2022-06-28
Payer: MEDICARE

## 2022-06-28 ENCOUNTER — ANESTHESIA EVENT (OUTPATIENT)
Dept: ENDOSCOPY | Facility: HOSPITAL | Age: 67
End: 2022-06-28
Payer: MEDICARE

## 2022-06-28 VITALS
TEMPERATURE: 99 F | WEIGHT: 270 LBS | DIASTOLIC BLOOD PRESSURE: 65 MMHG | HEART RATE: 65 BPM | RESPIRATION RATE: 18 BRPM | SYSTOLIC BLOOD PRESSURE: 123 MMHG | BODY MASS INDEX: 46.1 KG/M2 | HEIGHT: 64 IN | OXYGEN SATURATION: 97 %

## 2022-06-28 LAB — GLUCOSE BLD-MCNC: 107 MG/DL (ref 70–99)

## 2022-06-28 PROCEDURE — 88305 TISSUE EXAM BY PATHOLOGIST: CPT | Performed by: INTERNAL MEDICINE

## 2022-06-28 PROCEDURE — 0DBE8ZX EXCISION OF LARGE INTESTINE, VIA NATURAL OR ARTIFICIAL OPENING ENDOSCOPIC, DIAGNOSTIC: ICD-10-PCS | Performed by: INTERNAL MEDICINE

## 2022-06-28 PROCEDURE — 82962 GLUCOSE BLOOD TEST: CPT

## 2022-06-28 PROCEDURE — 0DBL8ZX EXCISION OF TRANSVERSE COLON, VIA NATURAL OR ARTIFICIAL OPENING ENDOSCOPIC, DIAGNOSTIC: ICD-10-PCS | Performed by: INTERNAL MEDICINE

## 2022-06-28 RX ORDER — NICOTINE POLACRILEX 4 MG
30 LOZENGE BUCCAL
Status: DISCONTINUED | OUTPATIENT
Start: 2022-06-28 | End: 2022-06-28

## 2022-06-28 RX ORDER — DEXTROSE MONOHYDRATE 25 G/50ML
50 INJECTION, SOLUTION INTRAVENOUS
Status: DISCONTINUED | OUTPATIENT
Start: 2022-06-28 | End: 2022-06-28

## 2022-06-28 RX ORDER — LIDOCAINE HYDROCHLORIDE 10 MG/ML
INJECTION, SOLUTION EPIDURAL; INFILTRATION; INTRACAUDAL; PERINEURAL AS NEEDED
Status: DISCONTINUED | OUTPATIENT
Start: 2022-06-28 | End: 2022-06-28 | Stop reason: SURG

## 2022-06-28 RX ORDER — SODIUM CHLORIDE, SODIUM LACTATE, POTASSIUM CHLORIDE, CALCIUM CHLORIDE 600; 310; 30; 20 MG/100ML; MG/100ML; MG/100ML; MG/100ML
INJECTION, SOLUTION INTRAVENOUS CONTINUOUS
Status: DISCONTINUED | OUTPATIENT
Start: 2022-06-28 | End: 2022-06-28

## 2022-06-28 RX ORDER — NICOTINE POLACRILEX 4 MG
15 LOZENGE BUCCAL
Status: DISCONTINUED | OUTPATIENT
Start: 2022-06-28 | End: 2022-06-28

## 2022-06-28 RX ORDER — NALOXONE HYDROCHLORIDE 0.4 MG/ML
80 INJECTION, SOLUTION INTRAMUSCULAR; INTRAVENOUS; SUBCUTANEOUS AS NEEDED
Status: DISCONTINUED | OUTPATIENT
Start: 2022-06-28 | End: 2022-06-28

## 2022-06-28 RX ADMIN — LIDOCAINE HYDROCHLORIDE 25 MG: 10 INJECTION, SOLUTION EPIDURAL; INFILTRATION; INTRACAUDAL; PERINEURAL at 07:35:00

## 2022-06-28 RX ADMIN — SODIUM CHLORIDE, SODIUM LACTATE, POTASSIUM CHLORIDE, CALCIUM CHLORIDE: 600; 310; 30; 20 INJECTION, SOLUTION INTRAVENOUS at 07:31:00

## 2022-06-28 RX ADMIN — SODIUM CHLORIDE, SODIUM LACTATE, POTASSIUM CHLORIDE, CALCIUM CHLORIDE: 600; 310; 30; 20 INJECTION, SOLUTION INTRAVENOUS at 07:54:00

## 2022-06-28 NOTE — ANESTHESIA POSTPROCEDURE EVALUATION
[address] [facility] Patient Status:  Hospital Outpatient Surgery   Age/Gender 79year old female MRN [id number]   Location [address] [address] Attending Kelsea Lyons MD   Hosp Day # 0 PCP Sherryle Lies, MD       Anesthesia Post-op Note    COLONOSCOPY with BIOPSY and COLD SNARE POLYPECTOMY    Procedure Summary     Date: 06/28/22 Room / Location: [facility] ENDOSCOPY 03 / [facility] ENDOSCOPY    Anesthesia Start: 1763 Anesthesia Stop: 1235    Procedure: COLONOSCOPY with BIOPSY and COLD SNARE POLYPECTOMY (N/A ) Diagnosis: (diverticulosis, polyp)    Surgeons: Kelsea Lyons MD Anesthesiologist: Arina Mota MD    Anesthesia Type: MAC ASA Status: 3          Anesthesia Type: MAC    Vitals Value Taken Time   /65 06/28/22 0755   Temp  06/28/22 0755   Pulse 76 06/28/22 0755   Resp 16 06/28/22 0755   SpO2 96 06/28/22 0755       Patient Location: Endoscopy    Anesthesia Type: MAC    Airway Patency: patent    Postop Pain Control: adequate    Mental Status: preanesthetic baseline    Nausea/Vomiting: none    Cardiopulmonary/Hydration status: stable euvolemic    Complications: no apparent anesthesia related complications    Postop vital signs: stable    Dental Exam: Unchanged from Preop    Patient to be discharged home when criteria met.

## 2022-06-28 NOTE — OPERATIVE REPORT
Colonoscopy Operative Report    Rayma Moment Patient Status:  Hospital Outpatient Surgery    1955 MRN [id number]   Location [address] [address] Attending Frantz Blackmon MD   Hosp Day #   0 PCP Jovany Alfaro MD     Pre-Operative Diagnosis: PERSONAL HISTORY OF COLONIC POLYPS, ABDOMINAL PAIN, CHRONIC DIARRHEA    Post-Operative Diagnosis:  Normal terminal ileum  Sigmoid diverticulosis  Normal colonic mucosa-random biopsies taken  4 mm transverse polyp resected by cold snare    Procedure Performed: Procedure(s):  COLONOSCOPY with BIOPSY and COLD SNARE POLYPECTOMY     Informed Consent: Informed consent for both the procedure and sedation were obtained from the patient. The potentially life-threatening complications of sedation, bleeding,  perforation, transfusion or repeat endoscopy  were reviewed along with the possible need for hospitalization, surgical management, transfusion or repeat endoscopy should one of these complications arise. The patient understands and is agreeable to proceed. Sedation Type: MAC-Patient received sedation with monitored anesthesia provided by an anesthesiologist      Cecum Withdrawal Time:  7 minutes  Date of previous colonoscopy:     Procedure Description: The patient was placed in the left lateral decubitus position. After careful digital rectal examination, the Adult colonoscope was inserted into the rectum and advanced to the level of the cecum under direct visualization. The cecum was identified by landmarks, including the appendiceal orifice and ileoceccal valve. Careful examination of the entire colon was performed during withdrawal of the endoscope. The scope was withdrawn to the rectum and retroflexion was performed. The patient tolerated the procedure well with no immediate complications. The patient was transferred to the recovery area in stable condition.   Quality of Preparation: Adequate  Aronchick Bowel Prep Scale:  good  Findings:   Normal terminal ileum  Sigmoid diverticulosis  Normal colonic mucosa-random biopsies taken  4 mm transverse polyp resected by cold snare    Recommendations: await pathology  Discharge: The patient was given an after visit summary detailing the procedure, findings, recommendations and follow up plans.      Vernon Mccoy MD  6/28/2022  7:51 AM

## 2024-12-17 ENCOUNTER — APPOINTMENT (OUTPATIENT)
Dept: GENERAL RADIOLOGY | Age: 69
End: 2024-12-17
Attending: STUDENT IN AN ORGANIZED HEALTH CARE EDUCATION/TRAINING PROGRAM

## 2024-12-17 ENCOUNTER — HOSPITAL ENCOUNTER (EMERGENCY)
Age: 69
Discharge: HOME | End: 2024-12-18
Attending: STUDENT IN AN ORGANIZED HEALTH CARE EDUCATION/TRAINING PROGRAM

## 2024-12-17 VITALS
SYSTOLIC BLOOD PRESSURE: 123 MMHG | RESPIRATION RATE: 10 BRPM | HEART RATE: 83 BPM | DIASTOLIC BLOOD PRESSURE: 72 MMHG | OXYGEN SATURATION: 100 % | TEMPERATURE: 98.3 F
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
## 2024-12-17 DIAGNOSIS — S51.851A CAT BITE OF RIGHT FOREARM, INITIAL ENCOUNTER: Primary | ICD-10-CM

## 2024-12-17 PROCEDURE — 10004651 HB RX, NO CHARGE ITEM: Performed by: STUDENT IN AN ORGANIZED HEALTH CARE EDUCATION/TRAINING PROGRAM

## 2024-12-17 PROCEDURE — 99283 EMERGENCY DEPT VISIT LOW MDM: CPT

## 2024-12-17 PROCEDURE — 73090 X-RAY EXAM OF FOREARM: CPT

## 2024-12-17 PROCEDURE — 10002803 HB RX 637: Performed by: STUDENT IN AN ORGANIZED HEALTH CARE EDUCATION/TRAINING PROGRAM

## 2024-12-17 RX ORDER — ACETAMINOPHEN 500 MG
1000 TABLET ORAL ONCE
Status: COMPLETED | OUTPATIENT
Start: 2024-12-17 | End: 2024-12-17

## 2024-12-17 RX ADMIN — ACETAMINOPHEN 1000 MG: 500 TABLET ORAL at 23:33

## 2024-12-17 RX ADMIN — AMOXICILLIN AND CLAVULANATE POTASSIUM 1 TABLET: 875; 125 TABLET, FILM COATED ORAL at 23:33

## 2024-12-17 ASSESSMENT — ENCOUNTER SYMPTOMS
WOUND: 1
FEVER: 0
COUGH: 0
SHORTNESS OF BREATH: 0
CHILLS: 0

## 2025-05-31 NOTE — ED PROVIDER NOTES
Patient Seen in: [facility] Emergency Department        History  Chief Complaint   Patient presents with    Dizziness    Gait     Stated Complaint: unstady gait and dizziness. Fast neg    Subjective:   HPI            Patient is a 70-year-old female with a history of diabetes, hyperlipidemia on lisinopril as well for renal protection coming to the ER with reports of intermittent dizziness since last night.  She states her symptoms started after she had got a shower around 8 PM.  Describes it as a sensation of things moving around her.  She was able to go to bed and things felt fine.      Objective:     Past Medical History:    Allergic rhinitis    Arthritis    Arthritis of right acromioclavicular joint    Calculus of kidney    left    Deep vein thrombosis (HCC)    after surgery    Diabetes (HCC)    dx 5/16 hgb a1c 14    Diabetic peripheral neuropathy (HCC)    Disorder of thyroid    Family history of ovarian cancer    Fibroepithelial polyp    High cholesterol    Hydronephrosis    Hydronephrosis, left    Left nephrolithiasis    hydronephrosis. left 2 cm stone. stent then placed.    Nephrolithiasis    Obesity    S/P ureteral reimplantation    Ureteral obstruction, right    Visual impairment    glasses              Past Surgical History:   Procedure Laterality Date    Colonoscopy N/A 11/22/2017    Procedure: COLONOSCOPY;  Surgeon: Joseph Singh MD;  Location:  ENDOSCOPY    Colonoscopy N/A 6/28/2022    Procedure: COLONOSCOPY with BIOPSY and COLD SNARE POLYPECTOMY;  Surgeon: Stanley Hutson MD;  Location:  ENDOSCOPY    Other surgical history      rt shoulder rotator cuff sg    Other surgical history      rt patella frx fragment removed arthroscopic    Other surgical history      left meniscus/cartilate knee sg arthroscopic    Other surgical history  5/16    left ureter stent for large stone 2 cm    Other surgical history  6/14/16    Cysto w stent removal- Dr Arrieta    Other surgical history  2/8/17     Cysto,LT URS, LT ureteral bx & baloon dilation, LT stent placement - Dr Arrieta    Other surgical history  06/20/2017    Spencer lap lt ureterolysis, ureteroneocystomy, psoas hitch, boari flap, lt stent placement- Dr Arrieta & Josefina    Other surgical history  07/25/2017    cystoscopy/Stent Removal                 Social History     Socioeconomic History    Marital status: Single   Tobacco Use    Smoking status: Never    Smokeless tobacco: Never   Vaping Use    Vaping status: Never Used   Substance and Sexual Activity    Alcohol use: No     Alcohol/week: 0.0 standard drinks of alcohol    Drug use: No                                Physical Exam    ED Triage Vitals [05/31/25 1331]   /73   Pulse 77   Resp 18   Temp 98.3 °F (36.8 °C)   Temp src Temporal   SpO2 97 %   O2 Device None (Room air)       Current Vitals:   Vital Signs  BP: 110/68  Pulse: 70  Resp: 16  Temp: 98.3 °F (36.8 °C)  Temp src: Temporal  MAP (mmHg): 77    Oxygen Therapy  SpO2: 100 %  O2 Device: None (Room air)            Physical Exam  Vitals and nursing note reviewed.   Constitutional:       General: She is not in acute distress.     Appearance: Normal appearance.   HENT:      Head: Normocephalic.      Nose: Nose normal.      Mouth/Throat:      Mouth: Mucous membranes are moist.   Eyes:      Extraocular Movements: Extraocular movements intact.      Pupils: Pupils are equal, round, and reactive to light.   Cardiovascular:      Rate and Rhythm: Normal rate and regular rhythm.      Pulses: Normal pulses.   Pulmonary:      Effort: Pulmonary effort is normal.   Abdominal:      General: Abdomen is flat. Bowel sounds are normal. There is no distension.      Palpations: Abdomen is soft.      Tenderness: There is no abdominal tenderness. There is no right CVA tenderness, left CVA tenderness, guarding or rebound.      Hernia: No hernia is present.   Musculoskeletal:         General: No swelling or tenderness. Normal range of motion.      Cervical back:  Normal range of motion.   Skin:     General: Skin is warm and dry.   Neurological:      Mental Status: She is alert and oriented to person, place, and time. Mental status is at baseline.      Cranial Nerves: No cranial nerve deficit or dysarthria.      Sensory: No sensory deficit.      Motor: No weakness.      Coordination: Romberg sign positive. Coordination normal.      Gait: Gait normal.   Psychiatric:         Mood and Affect: Mood normal.                 ED Course  Labs Reviewed   COMP METABOLIC PANEL (14) - Normal   POCT GLUCOSE - Normal   CBC WITH DIFFERENTIAL WITH PLATELET   RAINBOW DRAW LAVENDER   RAINBOW DRAW LIGHT GREEN     EKG    Rate, intervals and axes as noted on EKG Report.  Rate: 66  Rhythm: Sinus Rhythm  Reading: no ana/dep depressions or T wave inversions               MDM         Medical Decision Making  The differential includes the following  BPPV, posterior circulation cva, orthostasis, hypoglycemia     Pertinent comorbidities include  As detailed above     Pertinent social history includes  As detailed above    Labs  Blood sugar 76    Imaging studies  I reviewed the images and my independent interpreation after review is no intracranial hemorrhage on CT images. Additionaly, I reviewd the radiology report that states the following no acute CVA on MRI    MRI BRAIN (EUI=[id number])  Result Date: 5/31/2025  CONCLUSION:  Normal   LOCATION:  [facility]   Dictated by (CST): Jasper Palencia MD on 5/31/2025 at 4:44 PM     Finalized by (CST): Jasper Palencia MD on 5/31/2025 at 4:46 PM       CTA BRAIN + CTA CAROTIDS (CPT=70496/46970)  Result Date: 5/31/2025  CONCLUSION:  Cervical carotid stenosis as described, less than 50% stenosis.  No signs of occlusion, dissection, high-grade stenosis, acute thrombosis.  No acute bleed or other acute brain abnormality seen.   LOCATION:  [facility]   Dictated by (CST): Jasper Palencia MD on 5/31/2025 at 4:07 PM     Finalized by (CST): Jasper Palencia MD on 5/31/2025 at 4:11 PM             External data reviewed    Discussion of management with external providers  Dr. Ibrahim who recommends CTA followed by MRI    ER course  Patient is normotensive and hemodynamically stable here in no acute distress.  She has no focal deficits on exam.  She does have some slight imbalance with her Romberg test.  Case was discussed with Dr. Ordonez who recommends CTA and MRI.    Patient received meclizine here and continues to be asymptomatic.  Imaging studies as described above.  Patient given meclizine to go home with a recommendation to follow-up with PCP and GYN neuro.    Disposition and Plan     Clinical Impression:  1. Benign paroxysmal positional vertigo, unspecified laterality         Disposition:  Discharge  5/31/2025  5:13 pm    Follow-up:  Zofia Escudero MD  [address]  [address]  [facility] [address] [phone]    Follow up      Kashif Rene DO  [address]  [address]  [facility] [id number] [phone]    Follow up            Medications Prescribed:  Discharge Medication List as of 5/31/2025  5:14 PM        START taking these medications    Details   meclizine 25 MG Oral Tab Take 1 tablet (25 mg total) by mouth 3 (three) times daily as needed., Normal, Disp-30 tablet, R-0                   Supplementary Documentation:

## 2025-05-31 NOTE — ED INITIAL ASSESSMENT (HPI)
Patient sts woke up not feeling well. Sister said she said she felt dizzy yesterday. She was seen at immediate care in [place] for symptoms and told to go to the ER for further evaluation.

## (undated) DEVICE — DV OBTURATOR BLADELESS 8MM

## (undated) DEVICE — PAD SACRAL SPAN AID

## (undated) DEVICE — GOWN,SIRUS,FABRIC-REINFORCED,X-LARGE: Brand: MEDLINE

## (undated) DEVICE — Device

## (undated) DEVICE — SOL  .9 1000ML BTL

## (undated) DEVICE — ZIPWIRE GUIDEWIRE .038X150 STR

## (undated) DEVICE — GAUZE SPONGES,USP TYPE VII GAUZE, 12 PLY: Brand: CURITY

## (undated) DEVICE — ENDOPATH XCEL UNIVERSAL TROCAR STABLILITY SLEEVES: Brand: ENDOPATH XCEL

## (undated) DEVICE — FILTERLINE NASAL ADULT O2/CO2

## (undated) DEVICE — LARGE NEEDLE DRIVER: Brand: ENDOWRIST;DAVINCI SI

## (undated) DEVICE — SUTURE VICRYL 2-0 SH

## (undated) DEVICE — TROCAR BLADELESS 12MM B12LT

## (undated) DEVICE — TOWEL: OR BLU 80/CS: Brand: MEDICAL ACTION INDUSTRIES

## (undated) DEVICE — DERMABOND LIQUID ADHESIVE

## (undated) DEVICE — DRAIN BLAKE ROUND 15FR

## (undated) DEVICE — 1200CC GUARDIAN II: Brand: GUARDIAN

## (undated) DEVICE — INTENDED TO BE USED TO OCCLUDE, RETRACT AND IDENTIFY ARTERIES, VEINS, TENDONS AND NERVES IN SURGICAL PROCEDURES: Brand: STERION®  VESSEL LOOP

## (undated) DEVICE — FORCEP RADIAL JAW 4

## (undated) DEVICE — SUTURE MONOCRYL 4-0 PS-2

## (undated) DEVICE — KENDALL SCD EXPRESS SLEEVES, KNEE LENGTH, MEDIUM: Brand: KENDALL SCD

## (undated) DEVICE — DRAPE WARMER ORS-300

## (undated) DEVICE — TAPE CLOTH ADHESIVE 3

## (undated) DEVICE — C-ARM: Brand: UNBRANDED

## (undated) DEVICE — MARYLAND BIPOLAR FORCEPS: Brand: ENDOWRIST;DAVINCI SI

## (undated) DEVICE — STERILE POLYISOPRENE POWDER-FREE SURGICAL GLOVES: Brand: PROTEXIS

## (undated) DEVICE — BIPOLAR FORCEPS CORD,BANANA LEADS: Brand: VALLEYLAB

## (undated) DEVICE — ENDOPATH XCEL BLADELESS TROCARS WITH STABILITY SLEEVES: Brand: ENDOPATH XCEL

## (undated) DEVICE — TUBING CYSTO TUR DUAL

## (undated) DEVICE — LAP CHOLE/APPY CDS-LF: Brand: MEDLINE INDUSTRIES, INC.

## (undated) DEVICE — Device: Brand: DEFENDO AIR/WATER/SUCTION AND BIOPSY VALVE

## (undated) DEVICE — ELECTRO LUBE IS A SINGLE PATIENT USE DEVICE THAT IS INTENDED TO BE USED ON ELECTROSURGICAL ELECTRODES TO REDUCE STICKING.: Brand: KEY SURGICAL ELECTRO LUBE

## (undated) DEVICE — SOL H2O 1000ML BTL

## (undated) DEVICE — LAVH/LAPAROSCOPY DRAPE: Brand: CONVERTORS

## (undated) DEVICE — SNARE 9MM 230CM 2.4MM EXACTO

## (undated) DEVICE — ENDOSCOPY PACK - LOWER: Brand: MEDLINE INDUSTRIES, INC.

## (undated) DEVICE — TRAP SPEC REMOVAL ETRAP 15CM

## (undated) DEVICE — GLOVE SURG SENSICARE SZ 6

## (undated) DEVICE — CAUTERY PENCIL

## (undated) DEVICE — SUTURE PROLENE 3-0 PS-1

## (undated) DEVICE — DV KIT ACCESSORY 4-ARM

## (undated) DEVICE — TIP COVER ACCESSORY

## (undated) DEVICE — DRAIN RELIAVAC 100CC

## (undated) DEVICE — SUTURE VICRYL 0

## (undated) DEVICE — 3M™ RED DOT™ MONITORING ELECTRODE WITH FOAM TAPE AND STICKY GEL, 50/BAG, 20/CASE, 72/PLT 2570: Brand: RED DOT™

## (undated) DEVICE — BANDAGE ROLL,100% COTTON, 6 PLY, LARGE: Brand: KERLIX

## (undated) DEVICE — FORCEP BIOPSY RJ4 LG CAP W/ND

## (undated) DEVICE — PROGRASP FORCEPS: Brand: ENDOWRIST;DAVINCI SI

## (undated) DEVICE — PLASTC TOOMEY SYRNG DISP

## (undated) DEVICE — COVER,MAYO STAND,STERILE: Brand: MEDLINE

## (undated) DEVICE — SUTURE SILK 2-0 FS

## (undated) DEVICE — SUTURE VICRYL 4-0 RB-1

## (undated) DEVICE — SUTURE VLOC 90 2-0 9\" 2145

## (undated) DEVICE — 40580 - THE PINK PAD - ADVANCED TRENDELENBURG POSITIONING KIT: Brand: 40580 - THE PINK PAD - ADVANCED TRENDELENBURG POSITIONING KIT

## (undated) DEVICE — MONOPOLAR CURVED SCISSORS: Brand: ENDOWRIST;DAVINCI SI

## (undated) DEVICE — ENDOPATH XCEL WITH OPTIVIEW TECHNOLOGY BLADELESS TROCARS WITH STABILITY SLEEVES: Brand: ENDOPATH XCEL OPTIVIEW

## (undated) NOTE — LETTER
Last Revised 02/07/06  Obstructive Sleep Apnea Questionnaire    Clinical signs and symptoms suggesting the possibility of JERRY    1. Predisposing physical characteristics (positive with any of the following present)  ? BMI 35kg/m²  ?  Craniofacial abnormalit pauses which are frightening to the observer, patient regularly falls asleep within minutes after being left unstimulated) in which case they should be treated as though they have severe sleep apnea.     The sleep laboratory’s assessment (none, mild, modera Point Total for B           C. Requirement for postoperative opioids.                Opioid requirement             Points   None 0    Low dose oral opiod 1    High dose oral opioids, parenteral or neuraxial opiods 3      Point Total for C        Estimation

## (undated) NOTE — IP AVS SNAPSHOT
[facility] [place] [address] [place], [address] ~ [phone]                Discharge Summary   6/20/2017    [facility]           Admission Information        Provider Department    6/20/2017 Chel Gr MD  [id number] Last time this was given:  40 mg on 6/20/2017  9:33 PM   Commonly known as:  LIPITOR        Take 40 mg by mouth nightly.                             Glucose Blood Strp   Commonly known as:  KYLE CONTOUR NEXT TEST        test twice daily    Bailey Mcdonald Finish your Levaquin prescription that you already started     INCISIONS  All incisions are closed with dissolvable stitches. Sometimes a super-glue is applied over incisions that will peal away over time.      Any dressings can be removed in 48 hours after Future Appointments     Jun 27, 2017  6:30 PM   POSTOP with Gurpreet Chávez MD   [facility] ([facility] [place] [address])    [address] [id number] [phone]              Immunization Hi MyChart     Visit Prescreen  You can access your MyChart to more actively manage your health care and view more details from this visit by going to [email or web address].   If you've recently had a stay at the Hospital you can access your discharge ins Pen-injector         Use:  Treat high blood sugar   Most common side effects: Low blood sugar:nausea, jitters, sweating, rapid heartbeat    What to report to your healthcare team:  Low blood sugar (less than 70) twice a week or high blood sugar (greater th

## (undated) NOTE — LETTER
11/27/2017          [place] [id number]    Dear Bal Lauren,       Here are the  biopsy/pathology findings from your recent Colonoscopy :    an adenomatous polyp(s), which is a benign potentially pre-can

## (undated) NOTE — LETTER
[facility]  Phone: [phone]  OUTSIDE TESTING RESULT REQUEST      TO: Dr. Felipe Melchor Date: 3/23/21    FAX #: [phone]     IMPORTANT: FOR YOUR IMMEDIATE ATTENTION  Please FAX all test results listed below to: [phone]

## (undated) NOTE — LETTER
[facility]  Phone: [phone]  Right Fax: [phone]  ABNORMAL VALUES FAX    Sent By:  Drury Hodgkin RN Date: 6/14/17    Patient Name: Brianna [place]  Surgery Date: 6/20/2017    CSN: [id number]  Medical Record: